# Patient Record
Sex: FEMALE | Race: BLACK OR AFRICAN AMERICAN | NOT HISPANIC OR LATINO | Employment: FULL TIME | URBAN - METROPOLITAN AREA
[De-identification: names, ages, dates, MRNs, and addresses within clinical notes are randomized per-mention and may not be internally consistent; named-entity substitution may affect disease eponyms.]

---

## 2018-12-28 ENCOUNTER — TELEPHONE (OUTPATIENT)
Dept: PHYSICAL MEDICINE AND REHAB | Facility: CLINIC | Age: 28
End: 2018-12-28

## 2018-12-28 NOTE — TELEPHONE ENCOUNTER
Informed patient Blanca Hardin does take medicaid.  appt has been scheduled for 1/7/19.  Informed patient if appt is not scheduled appropriately someone will contact her.  Verbalized understanding.

## 2018-12-28 NOTE — TELEPHONE ENCOUNTER
----- Message from Lynne Carranza sent at 12/28/2018 12:43 PM CST -----    Pt  Is a  New  Medicaid  Pt   And  Went  To  Er   At  [facility] and  Having  Should   Starting  At  Neck  radiating  Down  Back  Pain ,   Pt  Is calling to  See if    np  Will  Take  Her  Medicaid // please  Call  For  details [phone]   None

## 2019-01-07 ENCOUNTER — OFFICE VISIT (OUTPATIENT)
Dept: SPINE | Facility: CLINIC | Age: 29
End: 2019-01-07
Payer: MEDICAID

## 2019-01-07 VITALS
HEIGHT: 68 IN | RESPIRATION RATE: 16 BRPM | WEIGHT: 141 LBS | DIASTOLIC BLOOD PRESSURE: 71 MMHG | SYSTOLIC BLOOD PRESSURE: 117 MMHG | HEART RATE: 90 BPM | BODY MASS INDEX: 21.37 KG/M2

## 2019-01-07 DIAGNOSIS — M54.2 CERVICALGIA: Primary | ICD-10-CM

## 2019-01-07 PROCEDURE — 99999 PR PBB SHADOW E&M-EST. PATIENT-LVL III: ICD-10-PCS | Mod: PBBFAC,,, | Performed by: PHYSICIAN ASSISTANT

## 2019-01-07 PROCEDURE — 99204 OFFICE O/P NEW MOD 45 MIN: CPT | Mod: S$PBB,,, | Performed by: PHYSICIAN ASSISTANT

## 2019-01-07 PROCEDURE — 99999 PR PBB SHADOW E&M-EST. PATIENT-LVL III: CPT | Mod: PBBFAC,,, | Performed by: PHYSICIAN ASSISTANT

## 2019-01-07 PROCEDURE — 99204 PR OFFICE/OUTPT VISIT, NEW, LEVL IV, 45-59 MIN: ICD-10-PCS | Mod: S$PBB,,, | Performed by: PHYSICIAN ASSISTANT

## 2019-01-07 PROCEDURE — 99213 OFFICE O/P EST LOW 20 MIN: CPT | Mod: PBBFAC,PN | Performed by: PHYSICIAN ASSISTANT

## 2019-01-07 RX ORDER — CYCLOBENZAPRINE HCL 10 MG
10 TABLET ORAL NIGHTLY PRN
Qty: 30 TABLET | Refills: 0 | Status: SHIPPED | OUTPATIENT
Start: 2019-01-07 | End: 2019-01-17

## 2019-01-07 RX ORDER — VALACYCLOVIR HYDROCHLORIDE 1 G/1
TABLET, FILM COATED ORAL
Refills: 5 | COMMUNITY
Start: 2018-12-25 | End: 2021-06-01 | Stop reason: CLARIF

## 2019-01-07 NOTE — PROGRESS NOTES
Neurosurgery History & Physical    Patient ID: Deidra Rao is a 28 y.o. female.    Chief Complaint   Patient presents with    Neck Pain     that radiates down the shoulder and arm        Review of Systems   Constitutional: Negative for activity change, appetite change, chills, fever and unexpected weight change.   HENT: Negative for tinnitus, trouble swallowing and voice change.    Respiratory: Negative for apnea, cough, chest tightness and shortness of breath.    Cardiovascular: Negative for chest pain and palpitations.   Gastrointestinal: Negative for constipation, diarrhea, nausea and vomiting.   Genitourinary: Negative for difficulty urinating, dysuria, frequency and urgency.   Musculoskeletal: Positive for myalgias and neck pain. Negative for back pain, gait problem and neck stiffness.   Skin: Negative for wound.   Neurological: Positive for numbness. Negative for dizziness, tremors, seizures, facial asymmetry, speech difficulty, weakness, light-headedness and headaches.   Psychiatric/Behavioral: Negative for confusion and decreased concentration.       Past Medical History:   Diagnosis Date    Bacterial vaginosis      Social History     Socioeconomic History    Marital status: Single     Spouse name: Not on file    Number of children: Not on file    Years of education: Not on file    Highest education level: Not on file   Social Needs    Financial resource strain: Not on file    Food insecurity - worry: Not on file    Food insecurity - inability: Not on file    Transportation needs - medical: Not on file    Transportation needs - non-medical: Not on file   Occupational History    Not on file   Tobacco Use    Smoking status: Never Smoker    Smokeless tobacco: Never Used   Substance and Sexual Activity    Alcohol use: No    Drug use: No    Sexual activity: Not on file   Other Topics Concern    Not on file   Social History Narrative    Not on file     No family history on file.  Review of  "patient's allergies indicates:  No Known Allergies    Current Outpatient Medications:     gabapentin (NEURONTIN) 100 MG capsule, Take 3 capsules (300 mg total) by mouth 3 (three) times daily., Disp: 270 capsule, Rfl: 0    valACYclovir (VALTREX) 1000 MG tablet, TK 2 TS PO BID FOR 1 DAY, Disp: , Rfl: 5    acetaminophen (TYLENOL) 500 MG tablet, Take 2 tablets (1,000 mg total) by mouth 3 (three) times daily as needed for Pain., Disp: 30 tablet, Rfl: 0    cyclobenzaprine (FLEXERIL) 10 MG tablet, Take 1 tablet (10 mg total) by mouth nightly as needed for Muscle spasms., Disp: 30 tablet, Rfl: 0    drospirenone-ethinyl estradiol (FANNY, 28,) 3-0.03 mg per tablet, Take 1 tablet by mouth., Disp: , Rfl:     ibuprofen (ADVIL,MOTRIN) 600 MG tablet, Take 1 tablet (600 mg total) by mouth every 6 (six) hours as needed for Pain., Disp: 20 tablet, Rfl: 0    loratadine (CLARITIN) 10 mg tablet, Take 1 tablet (10 mg total) by mouth once daily., Disp: 14 tablet, Rfl: 0    methylPREDNISolone (MEDROL DOSEPACK) 4 mg tablet, Take as directed, Disp: 1 Package, Rfl: 0    Vitals:    01/07/19 1041   BP: 117/71   BP Location: Left arm   Patient Position: Sitting   BP Method: Medium (Automatic)   Pulse: 90   Resp: 16   Weight: 64 kg (141 lb)   Height: 5' 8" (1.727 m)       Physical Exam   Constitutional: She is oriented to person, place, and time. She appears well-developed and well-nourished.   HENT:   Head: Normocephalic and atraumatic.   Eyes: Pupils are equal, round, and reactive to light.   Neck: Normal range of motion. Neck supple.   Cardiovascular: Normal rate.   Pulmonary/Chest: Effort normal.   Musculoskeletal: Normal range of motion. She exhibits no edema.   Neurological: She is alert and oriented to person, place, and time. She has a normal Finger-Nose-Finger Test, a normal Heel to Shin Test, a normal Romberg Test and a normal Tandem Gait Test. Gait normal.   Reflex Scores:       Tricep reflexes are 2+ on the right side and 2+ " on the left side.       Bicep reflexes are 2+ on the right side and 2+ on the left side.       Brachioradialis reflexes are 2+ on the right side and 2+ on the left side.       Patellar reflexes are 2+ on the right side and 2+ on the left side.       Achilles reflexes are 2+ on the right side and 2+ on the left side.  Skin: Skin is warm, dry and intact.   Psychiatric: She has a normal mood and affect. Her speech is normal and behavior is normal. Judgment and thought content normal.   Nursing note and vitals reviewed.      Neurologic Exam     Mental Status   Oriented to person, place, and time.   Oriented to person.   Oriented to place.   Oriented to time.   Follows 3 step commands.   Attention: normal. Concentration: normal.   Speech: speech is normal   Level of consciousness: alert  Knowledge: consistent with education.   Able to name object. Able to read. Able to repeat. Able to write. Normal comprehension.     Cranial Nerves     CN II   Visual acuity: normal  Right visual field deficit: none  Left visual field deficit: none     CN III, IV, VI   Pupils are equal, round, and reactive to light.  Right pupil: Size: 3 mm. Shape: regular. Reactivity: brisk. Consensual response: intact.   Left pupil: Size: 3 mm. Shape: regular. Reactivity: brisk. Consensual response: intact.   CN III: no CN III palsy  CN VI: no CN VI palsy  Nystagmus: none   Diplopia: none  Ophthalmoparesis: none  Conjugate gaze: present    CN V   Right facial sensation deficit: none  Left facial sensation deficit: none    CN VII   Right facial weakness: none  Left facial weakness: none    CN VIII   Hearing: intact    CN IX, X   CN IX normal.   CN X normal.     CN XI   Right sternocleidomastoid strength: normal  Left sternocleidomastoid strength: normal  Right trapezius strength: normal  Left trapezius strength: normal    CN XII   Fasciculations: absent  Tongue deviation: none    Motor Exam   Muscle bulk: normal  Overall muscle tone: normal  Right arm  pronator drift: absent  Left arm pronator drift: absent    Strength   Right neck flexion: 5/5  Left neck flexion: 5/5  Right neck extension: 5/5  Left neck extension: 5/5  Right deltoid: 5/5  Left deltoid: 5/5  Right biceps: 5/5  Left biceps: 5/5  Right triceps: 5/5  Left triceps: 5/5  Right wrist flexion: 5/5  Left wrist flexion: 5/5  Right wrist extension: 5/5  Left wrist extension: 5/5  Right interossei: 5/5  Left interossei: 5/5  Right abdominals: 5/5  Left abdominals: 5/5  Right iliopsoas: 5/  Left iliopsoas:   Right quadriceps:   Left quadriceps:   Right hamstrin/5  Left hamstrin/5  Right glutei:   Left glutei:   Right anterior tibial:   Left anterior tibial:   Right posterior tibial:   Left posterior tibial:   Right peroneal:   Left peroneal:   Right gastroc: 5/  Left gastroc: /    Sensory Exam   Right arm light touch: normal  Left arm light touch: normal  Right leg light touch: normal  Left leg light touch: normal  Right arm vibration: normal  Left arm vibration: normal  Right arm pinprick: normal  Left arm pinprick: normal    Gait, Coordination, and Reflexes     Gait  Gait: normal    Coordination   Romberg: negative  Finger to nose coordination: normal  Heel to shin coordination: normal  Tandem walking coordination: normal    Tremor   Resting tremor: absent  Intention tremor: absent  Action tremor: absent    Reflexes   Right brachioradialis: 2+  Left brachioradialis: 2+  Right biceps: 2+  Left biceps: 2+  Right triceps: 2+  Left triceps: 2+  Right patellar: 2+  Left patellar: 2+  Right achilles: 2+  Left achilles: 2+  Right Rucker: absent  Left Rucker: absent  Right ankle clonus: absent  Left ankle clonus: absent      Provider dictation:  28-year-old female who is relatively healthy is referred through the emergency room for evaluation of neck and right arm pain.  She has had pain for approximately 3 months and describes constant pain the posterior neck with  radiation into the right shoulder and arm stopping at the elbow.  She feels numbness and tingling in the right upper arm no further than the elbow.  She denies left-sided symptoms.  She was seen in the emergency room 12/25/2018 for the same symptoms, and treated with oral steroids and gabapentin.  She has not had physical therapy or any other treatments thus far.  NDI score: 75%.  PHQ:  10.    On exam she is completely neurologically intact.  There are no sensory deficits.    I have reviewed x-rays of the cervical spine from 12/25/2018 revealing slight anterolisthesis of C4 on C5.  There is straightening of overall cervical lordosis which is likely related to spasm.  Mild disc space narrowing is seen at C5-6.    I have reviewed an MRI of the cervical spine from 12/25/2018 demonstrating C5-6 mild disc osteophyte complex with mild left-sided component.  Overall no significant central canal or foraminal narrowing.    In conclusion, this is a 28-year-old female with acute onset right-sided neck and upper arm pain in a right C5 distribution.  She has mild degenerative changes on imaging with mild findings at C5/6.  There are no focal right sided deficits nor abnormalities noted on imaging - supporting more of a myofascial Causse of pain.  I am referring her to PT.  We discussed NADER, but her insurance will not cover it.  Surgical intervention is not warranted.  I have prescribed a muscle relaxant to help particularly at night.  Follow up as needed.    Visit Diagnosis:  Cervicalgia  -     Ambulatory Referral to Physical/Occupational Therapy  -     cyclobenzaprine (FLEXERIL) 10 MG tablet; Take 1 tablet (10 mg total) by mouth nightly as needed for Muscle spasms.  Dispense: 30 tablet; Refill: 0        Total time spent counseling greater than fifty percent of total visit time.  Counseling included discussion regarding imaging findings, diagnosis possibilities, treatment options, risks and benefits.   The patient had many  questions regarding the options and long-term effects.

## 2020-07-14 ENCOUNTER — LAB VISIT (OUTPATIENT)
Dept: FAMILY MEDICINE | Facility: CLINIC | Age: 30
End: 2020-07-14
Payer: MEDICAID

## 2020-07-14 DIAGNOSIS — Z01.818 PRE-OP TESTING: ICD-10-CM

## 2020-07-14 PROCEDURE — U0003 INFECTIOUS AGENT DETECTION BY NUCLEIC ACID (DNA OR RNA); SEVERE ACUTE RESPIRATORY SYNDROME CORONAVIRUS 2 (SARS-COV-2) (CORONAVIRUS DISEASE [COVID-19]), AMPLIFIED PROBE TECHNIQUE, MAKING USE OF HIGH THROUGHPUT TECHNOLOGIES AS DESCRIBED BY CMS-2020-01-R: HCPCS

## 2020-07-16 LAB — SARS-COV-2 RNA RESP QL NAA+PROBE: NOT DETECTED

## 2020-07-17 PROBLEM — N90.60 LABIA ENLARGED: Status: ACTIVE | Noted: 2020-07-17

## 2020-07-17 PROBLEM — R10.2 VAGINAL PAIN: Status: ACTIVE | Noted: 2020-07-17

## 2022-02-23 DIAGNOSIS — M54.2 CERVICALGIA: Primary | ICD-10-CM

## 2022-04-06 PROBLEM — R87.612 LOW GRADE INTREPITH LESION CYTO SMR CRVX (LGSIL): Status: ACTIVE | Noted: 2022-04-06

## 2022-04-26 PROBLEM — R22.31 MASS OF ARM, RIGHT: Status: ACTIVE | Noted: 2022-04-26
